# Patient Record
Sex: MALE | Race: ASIAN | NOT HISPANIC OR LATINO | Employment: UNEMPLOYED | URBAN - METROPOLITAN AREA
[De-identification: names, ages, dates, MRNs, and addresses within clinical notes are randomized per-mention and may not be internally consistent; named-entity substitution may affect disease eponyms.]

---

## 2024-06-19 ENCOUNTER — OFFICE VISIT (OUTPATIENT)
Dept: URGENT CARE | Facility: URGENT CARE | Age: 59
End: 2024-06-19
Payer: COMMERCIAL

## 2024-06-19 ENCOUNTER — ANCILLARY PROCEDURE (OUTPATIENT)
Dept: GENERAL RADIOLOGY | Facility: CLINIC | Age: 59
End: 2024-06-19
Attending: STUDENT IN AN ORGANIZED HEALTH CARE EDUCATION/TRAINING PROGRAM
Payer: COMMERCIAL

## 2024-06-19 VITALS
WEIGHT: 159.9 LBS | HEART RATE: 65 BPM | DIASTOLIC BLOOD PRESSURE: 76 MMHG | OXYGEN SATURATION: 96 % | SYSTOLIC BLOOD PRESSURE: 118 MMHG | RESPIRATION RATE: 16 BRPM | TEMPERATURE: 98.7 F

## 2024-06-19 DIAGNOSIS — J02.9 SORE THROAT: ICD-10-CM

## 2024-06-19 DIAGNOSIS — R05.1 ACUTE COUGH: ICD-10-CM

## 2024-06-19 DIAGNOSIS — L03.213 PRESEPTAL CELLULITIS OF LEFT LOWER EYELID: Primary | ICD-10-CM

## 2024-06-19 PROBLEM — I10 ESSENTIAL HYPERTENSION: Status: ACTIVE | Noted: 2018-08-15

## 2024-06-19 PROBLEM — J42 CHRONIC BRONCHITIS (H): Status: ACTIVE | Noted: 2024-04-12

## 2024-06-19 PROBLEM — E78.2 MIXED HYPERLIPIDEMIA: Status: ACTIVE | Noted: 2018-08-15

## 2024-06-19 LAB
DEPRECATED S PYO AG THROAT QL EIA: NEGATIVE
GROUP A STREP BY PCR: NOT DETECTED

## 2024-06-19 PROCEDURE — 99204 OFFICE O/P NEW MOD 45 MIN: CPT | Performed by: STUDENT IN AN ORGANIZED HEALTH CARE EDUCATION/TRAINING PROGRAM

## 2024-06-19 PROCEDURE — 87651 STREP A DNA AMP PROBE: CPT | Performed by: STUDENT IN AN ORGANIZED HEALTH CARE EDUCATION/TRAINING PROGRAM

## 2024-06-19 PROCEDURE — 71046 X-RAY EXAM CHEST 2 VIEWS: CPT | Mod: TC | Performed by: RADIOLOGY

## 2024-06-19 RX ORDER — CARVEDILOL 6.25 MG/1
TABLET ORAL
COMMUNITY
Start: 2024-03-11

## 2024-06-19 RX ORDER — BENZONATATE 100 MG/1
100 CAPSULE ORAL 3 TIMES DAILY PRN
Qty: 30 CAPSULE | Refills: 0 | Status: SHIPPED | OUTPATIENT
Start: 2024-06-19

## 2024-06-19 RX ORDER — ASPIRIN 81 MG/1
TABLET ORAL
COMMUNITY

## 2024-06-19 RX ORDER — ROSUVASTATIN CALCIUM 20 MG/1
TABLET, COATED ORAL
COMMUNITY
Start: 2023-06-07

## 2024-06-19 RX ORDER — LISINOPRIL 10 MG/1
5 TABLET ORAL DAILY
COMMUNITY

## 2024-06-19 NOTE — PROGRESS NOTES
ASSESSMENT & PLAN:   Diagnoses and all orders for this visit:  Preseptal cellulitis of left lower eyelid  -     amoxicillin-clavulanate (AUGMENTIN) 875-125 MG tablet; Take 1 tablet by mouth 2 times daily for 10 days  Acute cough  -     XR Chest 2 Views; Future  -     benzonatate (TESSALON) 100 MG capsule; Take 1 capsule (100 mg) by mouth 3 times daily as needed for cough  -     PRIMARY CARE FOLLOW-UP SCHEDULING; Future  Sore throat  -     Streptococcus A Rapid Screen w/Reflex to PCR - Clinic Collect  -     Group A Streptococcus PCR Throat Swab    Left eye swelling x 1 week. Left lower eyelid is edematous, erythematous, tender concerning for preseptal cellulitis. No pain with EOMs and no vision changes. Will start Augmentin x 10 days.    URI symptoms x 2 weeks. Rapid strep test negative, PCR pending. Chest x-ray negative. Likely viral etiology. Will treat with Tessalon as needed, Tylenol/ibuprofen as needed, rest, fluids.    Patient would like to establish care at [facility], referral to family medicine placed.      At the end of the encounter, I discussed results, diagnosis, medications. Discussed red flags for immediate return to clinic/ER, as well as indications for follow up if no improvement. Patient and/or caregiver understood and agreed to plan. Patient was stable for discharge.    Patient Instructions   Take Augmentin as directed for your eye infection.  Go to ER if you develop severe pain or vision changes.    Rapid strep test negative. Strep culture is pending -we will call you if this is positive.  For your sore throat, you may try salt water gargles, tea with honey, throat lozenges/spray (Cepacol), using a humidifier.  Take tylenol and/or ibuprofen as needed for pain/fever.  Stay well-hydrated, get plenty of rest, and wash your hands often.     Chest x-ray is normal. No pneumonia.  May use Tessalon as needed for your cough.  Continue your inhaler as needed.    Return in about 1 week (around 6/26/2024) for  follow-up with PCP if symptoms persist.    ------------------------------------------------------------------------  SUBJECTIVE  History was obtained from patient.   services used throughout visit.     Patient presents with:  Urgent Care  Eye Problem Left Eye: Using ipad for  (ID#:[id number]), eye hurts and swollen for a week, left eye, taking Advil, have a cough and dizzy for the same amount of time as the eye  Dizziness  Cough    HPI  Kerline Valadez is a(n) 58 year old male presenting to urgent care for   Left eye swelling x 1 week. It is painful to the touch. Reports tearing and eye crusted shut in the morning. No vision changes. Does not wear contacts.  Cough x 2 weeks. Endorses sore throat and rhinorrhea. No chest pain, shortness of breath, fever. Has mild asthma, using inhaler at home which improves his cough. He had sick exposure prior to symptoms starting.    Review of Systems    Current Outpatient Medications   Medication Sig Dispense Refill    amoxicillin-clavulanate (AUGMENTIN) 875-125 MG tablet Take 1 tablet by mouth 2 times daily for 10 days 20 tablet 0    aspirin 81 MG EC tablet Take by mouth 1 time per day      benzonatate (TESSALON) 100 MG capsule Take 1 capsule (100 mg) by mouth 3 times daily as needed for cough 30 capsule 0    carvedilol (COREG) 6.25 MG tablet       lisinopril (ZESTRIL) 10 MG tablet Take 5 mg by mouth daily      rosuvastatin (CRESTOR) 20 MG tablet TAKE 1 TABLET(20 MG) BY MOUTH EVERY NIGHT AT BEDTIME       Problem List:  2024-04: Chronic bronchitis (H)  2018-08: Mixed hyperlipidemia  2018-08: Essential hypertension    No Known Allergies      OBJECTIVE  Vitals:    06/19/24 1441   BP: 118/76   BP Location: Left arm   Patient Position: Sitting   Cuff Size: Adult Regular   Pulse: 65   Resp: 16   Temp: 98.7  F (37.1  C)   TempSrc: Oral   SpO2: 96%   Weight: 72.5 kg (159 lb 14.4 oz)     Physical Exam   GENERAL: healthy, alert, no acute distress.   PSYCH: mentation appears  normal. Normal affect  HEAD: normocephalic, atraumatic.  EYE: PERRL. EOMs intact with no pain. No scleral injection bilaterally. Left lower eyelid erythematous and edematous with tenderness to palpation. Left upper eyelid with very mild erythema.  EAR: external ear normal. Bilateral ear canals normal and nonpainful. Bilateral TM intact, pearly, translucent without bulging.  NOSE: external nose atraumatic without lesions.  OROPHARYNX: moist mucous membranes. Posterior pharynx erythematous. No exudate. Uvula midline. Patent airway.  LUNGS: no increased work of breathing. Clear lung sounds bilaterally. No wheezing, rhonchi, or rales.   CV: regular rate and rhythm. No clicks, murmurs, or rubs.    Xrays were preliminarily reviewed by me -negative.     Results for orders placed or performed in visit on 06/19/24   XR Chest 2 Views     Status: None (Preliminary result)    Narrative    CHEST 2 VIEWS   6/19/2024 3:38 PM     HISTORY: Cough x 2 wk; Acute cough.    COMPARISON: None.      Impression    IMPRESSION: No acute cardiopulmonary disease.   Results for orders placed or performed in visit on 06/19/24   Streptococcus A Rapid Screen w/Reflex to PCR - Clinic Collect     Status: Normal    Specimen: Throat; Swab   Result Value Ref Range    Group A Strep antigen Negative Negative

## 2024-06-19 NOTE — PATIENT INSTRUCTIONS
Take Augmentin as directed for your eye infection.  Go to ER if you develop severe pain or vision changes.    Rapid strep test negative. Strep culture is pending -we will call you if this is positive.  For your sore throat, you may try salt water gargles, tea with honey, throat lozenges/spray (Cepacol), using a humidifier.  Take tylenol and/or ibuprofen as needed for pain/fever.  Stay well-hydrated, get plenty of rest, and wash your hands often.     Chest x-ray is normal. No pneumonia.  May use Tessalon as needed for your cough.  Continue your inhaler as needed.